# Patient Record
Sex: FEMALE | Race: BLACK OR AFRICAN AMERICAN | Employment: STUDENT | URBAN - METROPOLITAN AREA
[De-identification: names, ages, dates, MRNs, and addresses within clinical notes are randomized per-mention and may not be internally consistent; named-entity substitution may affect disease eponyms.]

---

## 2018-12-20 ENCOUNTER — ED HISTORICAL/CONVERTED ENCOUNTER (OUTPATIENT)
Dept: OTHER | Age: 23
End: 2018-12-20

## 2019-08-27 ENCOUNTER — IP HISTORICAL/CONVERTED ENCOUNTER (OUTPATIENT)
Dept: OTHER | Age: 24
End: 2019-08-27

## 2023-03-03 ENCOUNTER — OFFICE VISIT (OUTPATIENT)
Dept: OBGYN CLINIC | Age: 28
End: 2023-03-03

## 2023-03-03 VITALS
BODY MASS INDEX: 36.29 KG/M2 | HEIGHT: 69 IN | SYSTOLIC BLOOD PRESSURE: 114 MMHG | DIASTOLIC BLOOD PRESSURE: 71 MMHG | WEIGHT: 245 LBS

## 2023-03-03 DIAGNOSIS — Z01.419 ROUTINE GYNECOLOGICAL EXAMINATION: Primary | ICD-10-CM

## 2023-03-03 NOTE — PROGRESS NOTES
Chief Complaint   Patient presents with    Annual Exam     Pt. Has a nexplanon that needs to come out.  We will order new device for placement      Visit Vitals  /71 (BP 1 Location: Left upper arm, BP Patient Position: Sitting, BP Cuff Size: Large adult)   Ht 5' 9\" (1.753 m)   Wt 245 lb (111.1 kg)   LMP 02/17/2023 (Approximate)   BMI 36.18 kg/m²

## 2023-03-03 NOTE — PROGRESS NOTES
HISTORY OF PRESENT ILLNESS  Preeti Berger is a 24 y.o. female who presents today for the following:  Chief Complaint   Patient presents with    Annual Exam     Pt. Has a nexplanon that needs to come out. We will order new device for placement    Patient is a 79-year-old G2, P2 female who presents today for routine annual exam.  She reports she has a Nexplanon in place for irregular menses and wishes to have it replaced as it is expiring.     No Known Allergies    No current outpatient medications on file. No current facility-administered medications for this visit. History reviewed. No pertinent past medical history. Past Surgical History:   Procedure Laterality Date    HX  SECTION  12 and 19       History reviewed. No pertinent family history. Social History     Socioeconomic History    Marital status: SINGLE     Spouse name: Not on file    Number of children: Not on file    Years of education: Not on file    Highest education level: Not on file   Occupational History    Not on file   Tobacco Use    Smoking status: Passive Smoke Exposure - Never Smoker     Passive exposure: Yes    Smokeless tobacco: Not on file   Substance and Sexual Activity    Alcohol use: Never    Drug use: Never    Sexual activity: Yes     Partners: Male     Birth control/protection: Condom   Other Topics Concern    Not on file   Social History Narrative    Not on file     Social Determinants of Health     Financial Resource Strain: Not on file   Food Insecurity: Not on file   Transportation Needs: Not on file   Physical Activity: Not on file   Stress: Not on file   Social Connections: Not on file   Intimate Partner Violence: Not on file   Housing Stability: Not on file           REVIEW OF SYSTEMS     Constitutional: Negative for chills, fever and malaise/fatigue. HENT: Negative for congestion, hearing loss and sore throat.     Respiratory: Negative for cough, sputum production, shortness of breath and wheezing. Cardiovascular: Negative for chest pain. Gastrointestinal: Negative for abdominal pain, constipation, diarrhea, nausea and vomiting. Genitourinary: Negative for dysuria, flank pain, hematuria and urgency. Neurological: Negative for dizziness, loss of consciousness, weakness and headaches. Psychiatric/Behavioral: Negative for depression. PHYSICAL EXAM  /71 (BP 1 Location: Left upper arm, BP Patient Position: Sitting, BP Cuff Size: Large adult)   Ht 5' 9\" (1.753 m)   Wt 245 lb (111.1 kg)   LMP 02/17/2023 (Approximate)   BMI 36.18 kg/m²      Patient is a well-developed well-nourished female no apparent distress  She is alert and oriented x3  Head is normocephalic atraumatic pupils equal round react light accommodation  Neck is supple without adenopathy or thyromegaly  Heart is with regular rate and rhythm without murmurs rubs or gallops  Lungs are clear to auscultation and percussion bilaterally  Breasts are without masses bilaterally  Abdomen is soft nontender nondistended bowel sounds are present and active  Extremities are without clubbing cyanosis or edema  Pulses are full and symmetric bilaterally  Pelvic  External genitalia within normal limits  Urethra is midline there are no apparent urethral lesions the bladder is within normal limits  Vagina is with normal rugae there is minimal discharge present in the vaginal vault  Cervix is parous, there are no apparent cervical lesions, there is no cervical motion tenderness  Uterus is normal size and contours  Adnexa are without masses    ASSESSMENT and PLAN  Normal annual exam  Plan: Pap performed, Nexplanon reordered, follow-up for Nexplanon replacement. No results found for this visit on 03/03/23. Orders Placed This Encounter    PAP IG, CT-NG-TV, RFX APTIMA HPV ASCUS ([id number]) ([facility])     Order Specific Question:   Pap Source? Answer:   Endocervical     Order Specific Question:   Total Hysterectomy? Answer:   No     Order Specific Question:   Supracervical Hysterectomy? Answer:   No     Order Specific Question:   Post Menopausal?     Answer:   No     Order Specific Question:   Hormone Therapy? Answer:   No     Order Specific Question:   IUD? Answer:   No     Order Specific Question:   Abnormal Bleeding? Answer:   No     Order Specific Question:   Pregnant     Answer:   No     Order Specific Question:   Post Partum? Answer:   No     Order Specific Question:   Pap collection method?      Answer:   Octavio Urban

## 2023-09-28 ENCOUNTER — TELEPHONE (OUTPATIENT)
Age: 28
End: 2023-09-28

## 2023-09-28 NOTE — TELEPHONE ENCOUNTER
Returned the patient's call and she states her Nexplanon was inserted 4 years ago and needs to be removed. Questioned what her appointment on 10/02/23 is for and she states her check up. Advised her she had an annual exam in the Spring and is not due again until next year.   Appointment changed to Nexplanon removal.

## 2023-10-02 ENCOUNTER — TELEPHONE (OUTPATIENT)
Age: 28
End: 2023-10-02

## 2023-10-02 ENCOUNTER — PROCEDURE VISIT (OUTPATIENT)
Age: 28
End: 2023-10-02
Payer: COMMERCIAL

## 2023-10-02 VITALS
HEIGHT: 69 IN | DIASTOLIC BLOOD PRESSURE: 82 MMHG | WEIGHT: 253.25 LBS | SYSTOLIC BLOOD PRESSURE: 126 MMHG | BODY MASS INDEX: 37.51 KG/M2

## 2023-10-02 DIAGNOSIS — Z30.46 NEXPLANON REMOVAL: Primary | ICD-10-CM

## 2023-10-02 PROCEDURE — 11982 REMOVE DRUG IMPLANT DEVICE: CPT | Performed by: OBSTETRICS & GYNECOLOGY

## 2023-10-02 NOTE — TELEPHONE ENCOUNTER
Returned the patient's call and advised her she will be contacted once the Nexplanon arrives in the office to schedule the insertion.

## 2023-10-02 NOTE — PROGRESS NOTES
Subjective:  Chief Complaints:       1. Nexplanon Removal.    HPI:         Jose Hutchins is a 34 y.o. female who is here for Nexplanon removal.  She would like to discuss other forms of menstrual control. No current outpatient medications on file. No current facility-administered medications for this visit. ROS:  Constitutional:  negative for fever, chills  Cardiology:       negative for chest pain, shortness of breath  Female Reproductive:  negative for pelvic pain, dyspareunia, abnormal vaginal discharge. Objective:  Physical Examination:  GENERAL:     General: alert and oriented x 3, well developed and well nourished. HEART: regular rate and rhythm, normal S1S2, no murmurs. LUNGS:  clear to auscultation bilaterally, no wheezes, rhonchi/rales. SKIN: normal, no rash or skin lesions. NEUROLOGIC EXAM:  alert and oriented x 3. Assessment:  The encounter diagnosis was Nexplanon removal.     Plan:  Stop Nexplanon implant, 68 mg, 1 ea, subcutaneously, once, 1 dose. Discussed that a small percentage of Nexplanon users will experience spotting/irreg bleeding with or without cramping for up to 90 days; can take NSAID if implant site has pain. Watch for infection of bleeding from implant site, notify us or go to ER if any problems. All questions answered. Procedures:  Nexplanon Implant Device:  Removal of Nexplanon Implant. Consent informed consent obtained, Pregnancy test negative, 30 second time out taken. Understands no contraception is 100% effective. Patient placed in supine position, marked left arm, 2 swabs of alcohol, injected 2-3cc 1% lido with epi, prepped Betadine  x 3. Incision made with 15 blade, hemostats used to grasp device, removal of subdermal device in left arm without any complications. Post procedure: the patient tolerated procedure well, antibiotic ointment applied followed by bandage, instructed to take NSAIDS as directed for pain.   Instructed to call us or ER if

## 2023-10-02 NOTE — TELEPHONE ENCOUNTER
Patient was seen with Leandro Mayen today 10/2/23, he wants her to come back in 2 weeks to have her birth control put it. She prefers a Wednesday or Thursday if available.

## 2023-10-13 ENCOUNTER — TELEPHONE (OUTPATIENT)
Age: 28
End: 2023-10-13

## 2023-10-13 NOTE — TELEPHONE ENCOUNTER
Spoke with pt and advised device is not in office yet, she needs to authorize shipment and once we receive the device we will call her to schedule

## 2023-10-13 NOTE — TELEPHONE ENCOUNTER
Called and left a voicemail on 10/13 at 9:19 am. Would like a returned call regarding her Birth Control.      I returned the patients call on 10/13 at 11:08 am. Would like to schedule an appointment for her IUD